# Patient Record
Sex: MALE | Race: WHITE | NOT HISPANIC OR LATINO | Employment: FULL TIME | URBAN - METROPOLITAN AREA
[De-identification: names, ages, dates, MRNs, and addresses within clinical notes are randomized per-mention and may not be internally consistent; named-entity substitution may affect disease eponyms.]

---

## 2024-12-23 ENCOUNTER — HOSPITAL ENCOUNTER (EMERGENCY)
Facility: HOSPITAL | Age: 54
Discharge: HOME | End: 2024-12-23
Payer: COMMERCIAL

## 2024-12-23 VITALS
RESPIRATION RATE: 18 BRPM | HEIGHT: 73 IN | OXYGEN SATURATION: 100 % | TEMPERATURE: 96.4 F | SYSTOLIC BLOOD PRESSURE: 125 MMHG | BODY MASS INDEX: 23.19 KG/M2 | HEART RATE: 60 BPM | WEIGHT: 175 LBS | DIASTOLIC BLOOD PRESSURE: 85 MMHG

## 2024-12-23 DIAGNOSIS — T14.8XXA FOREIGN BODY IN SKIN: Primary | ICD-10-CM

## 2024-12-23 PROCEDURE — 99283 EMERGENCY DEPT VISIT LOW MDM: CPT | Mod: 25

## 2024-12-23 PROCEDURE — 10120 INC&RMVL FB SUBQ TISS SMPL: CPT | Mod: FA

## 2024-12-23 RX ORDER — AMOXICILLIN AND CLAVULANATE POTASSIUM 875; 125 MG/1; MG/1
1 TABLET, FILM COATED ORAL EVERY 12 HOURS
Qty: 14 TABLET | Refills: 0 | Status: SHIPPED | OUTPATIENT
Start: 2024-12-23 | End: 2024-12-30

## 2024-12-23 ASSESSMENT — LIFESTYLE VARIABLES
EVER FELT BAD OR GUILTY ABOUT YOUR DRINKING: NO
HAVE PEOPLE ANNOYED YOU BY CRITICIZING YOUR DRINKING: NO
TOTAL SCORE: 0
EVER HAD A DRINK FIRST THING IN THE MORNING TO STEADY YOUR NERVES TO GET RID OF A HANGOVER: NO
HAVE YOU EVER FELT YOU SHOULD CUT DOWN ON YOUR DRINKING: NO

## 2024-12-23 ASSESSMENT — COLUMBIA-SUICIDE SEVERITY RATING SCALE - C-SSRS
1. IN THE PAST MONTH, HAVE YOU WISHED YOU WERE DEAD OR WISHED YOU COULD GO TO SLEEP AND NOT WAKE UP?: NO
2. HAVE YOU ACTUALLY HAD ANY THOUGHTS OF KILLING YOURSELF?: NO
6. HAVE YOU EVER DONE ANYTHING, STARTED TO DO ANYTHING, OR PREPARED TO DO ANYTHING TO END YOUR LIFE?: NO

## 2024-12-23 NOTE — ED PROVIDER NOTES
Limitations to History: none  External Records Reviewed  Independent Historians: self  Social determinants affecting care: none    HPI  Hernán Page is a 54 y.o. male who presents emergency department for assessment of foreign body in his left first and second digit.  He reports that 2 weeks ago he was on vacation.  He was snorkeling and touched a rock.  He got out and felt okay however held a conch shell and noticed that he had a lot of pain to his thumb and second digit.  He noticed that he had likely touched a see urgent.  He reports that he is been looking online how to remove the spines but has been unsuccessful.  He has not had any fever or chills.  Denies nausea or vomiting.  There is not been any drainage from the wound.  He reports that he was just going to wait to see if maybe they would work themselves out however his wife wanted him to be assessed.  He has no further complaints.    PMH  Past Medical History:   Diagnosis Date    Epilepsy, unspecified, not intractable, without status epilepticus     Epilepsy    Personal history of Hodgkin lymphoma     History of Hodgkin's lymphoma    Personal history of other diseases of the nervous system and sense organs     History of neuropathy    Personal history of other venous thrombosis and embolism     History of blood clots    reviewed by myself.    Meds  Current Outpatient Medications   Medication Instructions    amoxicillin-pot clavulanate (Augmentin) 875-125 mg tablet 1 tablet, oral, Every 12 hours       Allergies  Allergies   Allergen Reactions    Pregabalin Other and Unknown     suicidal thoughts    Suicidal thoughts    Sertraline Other and Diarrhea     abdominal pain    reviewed by myself.    SHx  Social History     Tobacco Use    Smoking status: Never    Smokeless tobacco: Never   Substance Use Topics    Alcohol use: Yes     Comment: socially    reviewed by  "myself.      ------------------------------------------------------------------------------------------------------------------------------------------    /85 (BP Location: Right arm, Patient Position: Sitting)   Pulse 58   Temp 35.8 °C (96.4 °F) (Tympanic)   Resp 18   Ht 1.854 m (6' 1\")   Wt 79.4 kg (175 lb)   SpO2 100%   BMI 23.09 kg/m²     Physical Exam  Constitutional:       Appearance: Normal appearance. He is normal weight.   HENT:      Head: Normocephalic.      Nose: Nose normal.      Mouth/Throat:      Mouth: Mucous membranes are moist.   Eyes:      Extraocular Movements: Extraocular movements intact.      Conjunctiva/sclera: Conjunctivae normal.   Cardiovascular:      Rate and Rhythm: Normal rate and regular rhythm.   Pulmonary:      Effort: Pulmonary effort is normal.      Breath sounds: Normal breath sounds.   Musculoskeletal:         General: Normal range of motion.      Cervical back: Neck supple.      Comments: Full active range of motion of the digits of the left hand with extension and flexion.  Sensation intact distally.  Capillary refill is brisk.  Radial pulse intact.   Skin:     General: Skin is warm and dry.      Comments: Small foreign body noted to the palmar surface of the left first digit distally.  There is slight swelling surrounding.  No erythema.  No active drainage.  There is also a very tiny foreign body to the lateral palmar surface of the left second digit distally.  No erythema.  No lymphangitis.  Tenderness to palpate the foreign bodies.   Neurological:      Mental Status: He is alert and oriented to person, place, and time.   Psychiatric:         Attention and Perception: Attention normal.         Mood and Affect: Mood normal.          ------------------------------------------------------------------------------------------------------------------------------------------  Labs  Labs Reviewed - No data to display     Imaging  No orders to display        ED " Course  Diagnoses as of 12/23/24 0833   Foreign body in skin        Medical Decision Making: He did not appear ill or toxic.  Vital signs reviewed and stable.  I used 1% lidocaine without epinephrine to locally anesthetize the left first digit.  I then used an 11 blade scalpel to approve the skin.  I was able to use forceps to remove the foreign body in the left thumb.  Small amount of purulence drained from the thumb.  The tiny foreign body in the left second digit likely will be unable to remove.  Patient would like me to try to do the same thing.  I again used 1% lidocaine without epinephrine to locally anesthetize the left second digit.  I used an 11 blade to approve the skin.  I was unsuccessful at removing the tiny foreign body of the left second digit.  I discussed with him that I recommend warm soaks at home as this may come to the surface and be easily removed.  I referred him to hand surgeon on-call for follow-up within 1 week.  He will be started on Augmentin 875 mg twice daily for 7 days due to the small amount of purulent drainage to the left thumb.  He is to return to the ER immediately if symptoms change or worsen.  He verbalized understanding and agreed to plan of care.  He was discharged home in stable condition.     Diagnosis: Foreign body in skin  Plan: Discharge     Geronimo Garnica PA-C  12/23/24 8268

## 2024-12-23 NOTE — DISCHARGE INSTRUCTIONS
Foreign body was removed from your thumb.  There is still a tiny foreign body in your finger.  Perform warm soaks.  This will likely just take time to come out.  You were covered with antibiotics due to the drainage to your thumb.  Please follow-up with hand surgeon within 1 week for follow-up appointment.  Return to ER immediately if symptoms change or worsen.

## 2024-12-23 NOTE — ED TRIAGE NOTES
Pt presents to ED for wound check to left thumb and pointer finger. Pt states he touched a sea urchin 2 weeks ago and believes he has something stuck in the thumb. Pt denies redness or drainage to the area. Denies fevers, chills.

## 2025-05-23 ENCOUNTER — TELEPHONE (OUTPATIENT)
Dept: GASTROENTEROLOGY | Facility: EXTERNAL LOCATION | Age: 55
End: 2025-05-23
Payer: COMMERCIAL